# Patient Record
Sex: FEMALE | Race: WHITE | NOT HISPANIC OR LATINO | URBAN - METROPOLITAN AREA
[De-identification: names, ages, dates, MRNs, and addresses within clinical notes are randomized per-mention and may not be internally consistent; named-entity substitution may affect disease eponyms.]

---

## 2018-03-20 ENCOUNTER — EMERGENCY (EMERGENCY)
Facility: HOSPITAL | Age: 58
LOS: 0 days | Discharge: HOME | End: 2018-03-20
Admitting: GENERAL PRACTICE

## 2018-03-20 VITALS
TEMPERATURE: 97 F | RESPIRATION RATE: 18 BRPM | HEART RATE: 115 BPM | DIASTOLIC BLOOD PRESSURE: 84 MMHG | SYSTOLIC BLOOD PRESSURE: 130 MMHG | OXYGEN SATURATION: 96 %
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
## 2018-03-20 DIAGNOSIS — S80.02XA CONTUSION OF LEFT KNEE, INITIAL ENCOUNTER: ICD-10-CM
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
## 2018-03-20 DIAGNOSIS — R07.81 PLEURODYNIA: ICD-10-CM
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
## 2018-03-20 DIAGNOSIS — M79.642 PAIN IN LEFT HAND: ICD-10-CM
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
## 2018-03-20 DIAGNOSIS — Z88.8 ALLERGY STATUS TO OTHER DRUGS, MEDICAMENTS AND BIOLOGICAL SUBSTANCES: ICD-10-CM

## 2018-03-20 RX ORDER — METHOCARBAMOL 500 MG/1
1000 TABLET, FILM COATED ORAL ONCE
Qty: 0 | Refills: 0 | Status: COMPLETED | OUTPATIENT
Start: 2018-03-20 | End: 2018-03-20

## 2018-03-20 RX ORDER — ACETAMINOPHEN 500 MG
975 TABLET ORAL ONCE
Qty: 0 | Refills: 0 | Status: COMPLETED | OUTPATIENT
Start: 2018-03-20 | End: 2018-03-20

## 2018-03-20 RX ORDER — IBUPROFEN 200 MG
600 TABLET ORAL ONCE
Qty: 0 | Refills: 0 | Status: DISCONTINUED | OUTPATIENT
Start: 2018-03-20 | End: 2018-03-20

## 2018-03-20 RX ADMIN — Medication 975 MILLIGRAM(S): at 17:46

## 2018-03-20 RX ADMIN — METHOCARBAMOL 1000 MILLIGRAM(S): 500 TABLET, FILM COATED ORAL at 17:40

## 2018-03-20 NOTE — ED PROVIDER NOTE - MEDICAL DECISION MAKING DETAILS
I have discussed the discharge plan with the patient. The patient agrees with the plan, as discussed.  The patient understands Emergency Department diagnosis is a preliminary diagnosis often based on limited information and that the patient must adhere to the follow-up plan as discussed.  The patient understands that if the symptoms worsen or if prescribed medications do not have the desired/planned effect that the patient may return to the Emergency Department at any time for further evaluation and treatment. Return precautions advised.

## 2018-03-20 NOTE — ED PROVIDER NOTE - CARE PLAN
Principal Discharge DX:	Fall  Secondary Diagnosis:	Rib pain on left side  Secondary Diagnosis:	Knee contusion

## 2018-03-20 NOTE — ED PROVIDER NOTE - PHYSICAL EXAMINATION
MSK: (+) Mild TTP to proximal palmar aspect of left hand without deformity, swelling, ecchymosis or erythema. FROM of left wrist without TTP. (+) TTP to left knee with mild associated swelling and ecchymosis. No deformity. FROM. Stable to varus/valgus stress. Strength 5/5. DP 2+ B/L  BACK: No midline TTP. No paraspinal TTP.  NECK: (+) Mild TTP to left trapezius. No midline TTP. FROM.

## 2018-03-20 NOTE — ED PROVIDER NOTE - OBJECTIVE STATEMENT
59 yo F with PMHx of thyroid CA s/p resection and chronic back pain presents to the ED c/o left sided rib pain, left hand pain and left knee pain s/p mechanical fall. Pt was walking to doctors office and tripped over concrete and fell onto left knee and then her left side. She was ambulatory after fall. Pt denies fever, chills, nausea, vomiting, abdominal pain, back pain, neck pain, weakness, numbness, paresthesias, bowel/bladder dysfunction, SOB, chest pain, LOC, head trauma, use of anticoagulation medications.

## 2018-03-20 NOTE — ED PROVIDER NOTE - CARDIAC, MLM
Normal rate, regular rhythm.  Heart sounds S1, S2.  No murmurs, rubs or gallops. No chest wall TTP or crepitus. (+) Mild left lateral rib TTP without ecchymosis, erythema or crepitus.

## 2018-11-19 PROBLEM — Z00.00 ENCOUNTER FOR PREVENTIVE HEALTH EXAMINATION: Status: ACTIVE | Noted: 2018-11-19

## 2018-11-26 ENCOUNTER — APPOINTMENT (OUTPATIENT)
Dept: ORTHOPEDIC SURGERY | Facility: CLINIC | Age: 58
End: 2018-11-26

## 2019-01-25 ENCOUNTER — INPATIENT (INPATIENT)
Facility: HOSPITAL | Age: 59
LOS: 0 days | Discharge: LEFT BEFORE DISCHARGE | End: 2019-01-26
Attending: INTERNAL MEDICINE | Admitting: INTERNAL MEDICINE

## 2019-01-25 VITALS
HEART RATE: 125 BPM | RESPIRATION RATE: 20 BRPM | DIASTOLIC BLOOD PRESSURE: 77 MMHG | OXYGEN SATURATION: 98 % | HEIGHT: 60 IN | WEIGHT: 184.97 LBS | SYSTOLIC BLOOD PRESSURE: 145 MMHG | TEMPERATURE: 99 F

## 2019-01-25 DIAGNOSIS — Z85.850 PERSONAL HISTORY OF MALIGNANT NEOPLASM OF THYROID: Chronic | ICD-10-CM

## 2019-01-25 DIAGNOSIS — M48.9 SPONDYLOPATHY, UNSPECIFIED: Chronic | ICD-10-CM

## 2019-01-25 LAB
ALBUMIN SERPL ELPH-MCNC: 3.5 G/DL — SIGNIFICANT CHANGE UP (ref 3.5–5.2)
ALP SERPL-CCNC: 185 U/L — HIGH (ref 30–115)
ALT FLD-CCNC: 85 U/L — HIGH (ref 0–41)
ANION GAP SERPL CALC-SCNC: 17 MMOL/L — HIGH (ref 7–14)
AST SERPL-CCNC: 87 U/L — HIGH (ref 0–41)
BASOPHILS # BLD AUTO: 0.04 K/UL — SIGNIFICANT CHANGE UP (ref 0–0.2)
BASOPHILS NFR BLD AUTO: 0.3 % — SIGNIFICANT CHANGE UP (ref 0–1)
BILIRUB SERPL-MCNC: <0.2 MG/DL — SIGNIFICANT CHANGE UP (ref 0.2–1.2)
BUN SERPL-MCNC: 12 MG/DL — SIGNIFICANT CHANGE UP (ref 10–20)
CALCIUM SERPL-MCNC: 8.4 MG/DL — LOW (ref 8.5–10.1)
CHLORIDE SERPL-SCNC: 98 MMOL/L — SIGNIFICANT CHANGE UP (ref 98–110)
CO2 SERPL-SCNC: 25 MMOL/L — SIGNIFICANT CHANGE UP (ref 17–32)
CREAT SERPL-MCNC: 0.6 MG/DL — LOW (ref 0.7–1.5)
EOSINOPHIL # BLD AUTO: 0.04 K/UL — SIGNIFICANT CHANGE UP (ref 0–0.7)
EOSINOPHIL NFR BLD AUTO: 0.3 % — SIGNIFICANT CHANGE UP (ref 0–8)
FLU A RESULT: POSITIVE
FLU A RESULT: POSITIVE
FLUAV AG NPH QL: POSITIVE
FLUBV AG NPH QL: NEGATIVE — SIGNIFICANT CHANGE UP
GAS PNL BLDV: SIGNIFICANT CHANGE UP
GLUCOSE SERPL-MCNC: 156 MG/DL — HIGH (ref 70–99)
HCT VFR BLD CALC: 37.1 % — SIGNIFICANT CHANGE UP (ref 37–47)
HGB BLD-MCNC: 12.1 G/DL — SIGNIFICANT CHANGE UP (ref 12–16)
IMM GRANULOCYTES NFR BLD AUTO: 1 % — HIGH (ref 0.1–0.3)
LYMPHOCYTES # BLD AUTO: 1.18 K/UL — LOW (ref 1.2–3.4)
LYMPHOCYTES # BLD AUTO: 9.6 % — LOW (ref 20.5–51.1)
MAGNESIUM SERPL-MCNC: 1.9 MG/DL — SIGNIFICANT CHANGE UP (ref 1.8–2.4)
MCHC RBC-ENTMCNC: 27.9 PG — SIGNIFICANT CHANGE UP (ref 27–31)
MCHC RBC-ENTMCNC: 32.6 G/DL — SIGNIFICANT CHANGE UP (ref 32–37)
MCV RBC AUTO: 85.5 FL — SIGNIFICANT CHANGE UP (ref 81–99)
MONOCYTES # BLD AUTO: 0.67 K/UL — HIGH (ref 0.1–0.6)
MONOCYTES NFR BLD AUTO: 5.5 % — SIGNIFICANT CHANGE UP (ref 1.7–9.3)
NEUTROPHILS # BLD AUTO: 10.19 K/UL — HIGH (ref 1.4–6.5)
NEUTROPHILS NFR BLD AUTO: 83.3 % — HIGH (ref 42.2–75.2)
PLATELET # BLD AUTO: 357 K/UL — SIGNIFICANT CHANGE UP (ref 130–400)
POTASSIUM SERPL-MCNC: 4 MMOL/L — SIGNIFICANT CHANGE UP (ref 3.5–5)
POTASSIUM SERPL-SCNC: 4 MMOL/L — SIGNIFICANT CHANGE UP (ref 3.5–5)
PROT SERPL-MCNC: 6.1 G/DL — SIGNIFICANT CHANGE UP (ref 6–8)
RBC # BLD: 4.34 M/UL — SIGNIFICANT CHANGE UP (ref 4.2–5.4)
RBC # FLD: 17.4 % — HIGH (ref 11.5–14.5)
RSV RESULT: NEGATIVE — SIGNIFICANT CHANGE UP
RSV RNA RESP QL NAA+PROBE: NEGATIVE — SIGNIFICANT CHANGE UP
SODIUM SERPL-SCNC: 140 MMOL/L — SIGNIFICANT CHANGE UP (ref 135–146)
TROPONIN T SERPL-MCNC: <0.01 NG/ML — SIGNIFICANT CHANGE UP
WBC # BLD: 12.24 K/UL — HIGH (ref 4.8–10.8)
WBC # FLD AUTO: 12.24 K/UL — HIGH (ref 4.8–10.8)

## 2019-01-25 RX ORDER — SODIUM CHLORIDE 9 MG/ML
1000 INJECTION INTRAMUSCULAR; INTRAVENOUS; SUBCUTANEOUS
Qty: 0 | Refills: 0 | Status: DISCONTINUED | OUTPATIENT
Start: 2019-01-25 | End: 2019-01-26

## 2019-01-25 RX ORDER — ATORVASTATIN CALCIUM 80 MG/1
1 TABLET, FILM COATED ORAL
Qty: 0 | Refills: 0 | COMMUNITY

## 2019-01-25 RX ORDER — AMLODIPINE BESYLATE 2.5 MG/1
1 TABLET ORAL
Qty: 0 | Refills: 0 | COMMUNITY

## 2019-01-25 RX ORDER — CYCLOBENZAPRINE HYDROCHLORIDE 10 MG/1
1 TABLET, FILM COATED ORAL
Qty: 0 | Refills: 0 | COMMUNITY

## 2019-01-25 RX ORDER — LEVOTHYROXINE SODIUM 125 MCG
100 TABLET ORAL AT BEDTIME
Qty: 0 | Refills: 0 | Status: DISCONTINUED | OUTPATIENT
Start: 2019-01-25 | End: 2019-01-26

## 2019-01-25 RX ORDER — IPRATROPIUM/ALBUTEROL SULFATE 18-103MCG
3 AEROSOL WITH ADAPTER (GRAM) INHALATION EVERY 6 HOURS
Qty: 0 | Refills: 0 | Status: DISCONTINUED | OUTPATIENT
Start: 2019-01-25 | End: 2019-01-26

## 2019-01-25 RX ORDER — ALBUTEROL 90 UG/1
2.5 AEROSOL, METERED ORAL ONCE
Qty: 0 | Refills: 0 | Status: COMPLETED | OUTPATIENT
Start: 2019-01-25 | End: 2019-01-25

## 2019-01-25 RX ORDER — SODIUM CHLORIDE 9 MG/ML
1000 INJECTION INTRAMUSCULAR; INTRAVENOUS; SUBCUTANEOUS ONCE
Qty: 0 | Refills: 0 | Status: COMPLETED | OUTPATIENT
Start: 2019-01-25 | End: 2019-01-25

## 2019-01-25 RX ORDER — LOSARTAN POTASSIUM 100 MG/1
100 TABLET, FILM COATED ORAL DAILY
Qty: 0 | Refills: 0 | Status: DISCONTINUED | OUTPATIENT
Start: 2019-01-25 | End: 2019-01-26

## 2019-01-25 RX ORDER — LANOLIN ALCOHOL/MO/W.PET/CERES
5 CREAM (GRAM) TOPICAL AT BEDTIME
Qty: 0 | Refills: 0 | Status: DISCONTINUED | OUTPATIENT
Start: 2019-01-25 | End: 2019-01-26

## 2019-01-25 RX ORDER — ACETAMINOPHEN 500 MG
650 TABLET ORAL EVERY 6 HOURS
Qty: 0 | Refills: 0 | Status: DISCONTINUED | OUTPATIENT
Start: 2019-01-25 | End: 2019-01-26

## 2019-01-25 RX ORDER — INFLUENZA VIRUS VACCINE 15; 15; 15; 15 UG/.5ML; UG/.5ML; UG/.5ML; UG/.5ML
0.5 SUSPENSION INTRAMUSCULAR ONCE
Qty: 0 | Refills: 0 | Status: DISCONTINUED | OUTPATIENT
Start: 2019-01-25 | End: 2019-01-26

## 2019-01-25 RX ORDER — ATORVASTATIN CALCIUM 80 MG/1
20 TABLET, FILM COATED ORAL AT BEDTIME
Qty: 0 | Refills: 0 | Status: DISCONTINUED | OUTPATIENT
Start: 2019-01-25 | End: 2019-01-26

## 2019-01-25 RX ORDER — AMLODIPINE BESYLATE 2.5 MG/1
5 TABLET ORAL DAILY
Qty: 0 | Refills: 0 | Status: DISCONTINUED | OUTPATIENT
Start: 2019-01-25 | End: 2019-01-26

## 2019-01-25 RX ORDER — LOSARTAN POTASSIUM 100 MG/1
1 TABLET, FILM COATED ORAL
Qty: 0 | Refills: 0 | COMMUNITY

## 2019-01-25 RX ORDER — ENOXAPARIN SODIUM 100 MG/ML
40 INJECTION SUBCUTANEOUS DAILY
Qty: 0 | Refills: 0 | Status: DISCONTINUED | OUTPATIENT
Start: 2019-01-25 | End: 2019-01-26

## 2019-01-25 RX ORDER — CYCLOBENZAPRINE HYDROCHLORIDE 10 MG/1
10 TABLET, FILM COATED ORAL
Qty: 0 | Refills: 0 | Status: DISCONTINUED | OUTPATIENT
Start: 2019-01-25 | End: 2019-01-26

## 2019-01-25 RX ORDER — DIPHENHYDRAMINE HCL 50 MG
50 CAPSULE ORAL ONCE
Qty: 0 | Refills: 0 | Status: COMPLETED | OUTPATIENT
Start: 2019-01-25 | End: 2019-01-25

## 2019-01-25 RX ORDER — LEVOTHYROXINE SODIUM 125 MCG
1 TABLET ORAL
Qty: 0 | Refills: 0 | COMMUNITY

## 2019-01-25 RX ORDER — PANTOPRAZOLE SODIUM 20 MG/1
40 TABLET, DELAYED RELEASE ORAL
Qty: 0 | Refills: 0 | Status: DISCONTINUED | OUTPATIENT
Start: 2019-01-25 | End: 2019-01-26

## 2019-01-25 RX ORDER — IPRATROPIUM/ALBUTEROL SULFATE 18-103MCG
3 AEROSOL WITH ADAPTER (GRAM) INHALATION ONCE
Qty: 0 | Refills: 0 | Status: COMPLETED | OUTPATIENT
Start: 2019-01-25 | End: 2019-01-25

## 2019-01-25 RX ADMIN — Medication 3 MILLILITER(S): at 07:00

## 2019-01-25 RX ADMIN — Medication 3 MILLILITER(S): at 03:48

## 2019-01-25 RX ADMIN — Medication 100 MICROGRAM(S): at 22:49

## 2019-01-25 RX ADMIN — Medication 3 MILLILITER(S): at 13:37

## 2019-01-25 RX ADMIN — ATORVASTATIN CALCIUM 20 MILLIGRAM(S): 80 TABLET, FILM COATED ORAL at 22:49

## 2019-01-25 RX ADMIN — SODIUM CHLORIDE 100 MILLILITER(S): 9 INJECTION INTRAMUSCULAR; INTRAVENOUS; SUBCUTANEOUS at 10:08

## 2019-01-25 RX ADMIN — Medication 5 MILLIGRAM(S): at 22:49

## 2019-01-25 RX ADMIN — Medication 50 MILLIGRAM(S): at 04:43

## 2019-01-25 RX ADMIN — CYCLOBENZAPRINE HYDROCHLORIDE 10 MILLIGRAM(S): 10 TABLET, FILM COATED ORAL at 22:49

## 2019-01-25 RX ADMIN — ALBUTEROL 2.5 MILLIGRAM(S): 90 AEROSOL, METERED ORAL at 17:08

## 2019-01-25 RX ADMIN — Medication 3 MILLILITER(S): at 03:30

## 2019-01-25 RX ADMIN — Medication 60 MILLIGRAM(S): at 03:56

## 2019-01-25 RX ADMIN — Medication 75 MILLIGRAM(S): at 09:53

## 2019-01-25 RX ADMIN — Medication 60 MILLIGRAM(S): at 17:49

## 2019-01-25 RX ADMIN — SODIUM CHLORIDE 1000 MILLILITER(S): 9 INJECTION INTRAMUSCULAR; INTRAVENOUS; SUBCUTANEOUS at 03:56

## 2019-01-25 RX ADMIN — AMLODIPINE BESYLATE 5 MILLIGRAM(S): 2.5 TABLET ORAL at 18:53

## 2019-01-25 RX ADMIN — SODIUM CHLORIDE 100 MILLILITER(S): 9 INJECTION INTRAMUSCULAR; INTRAVENOUS; SUBCUTANEOUS at 18:02

## 2019-01-25 RX ADMIN — Medication 75 MILLIGRAM(S): at 17:46

## 2019-01-25 NOTE — H&P ADULT - NSHPLABSRESULTS_GEN_ALL_CORE
T(F): 99  HR: 125  BP: 145/77  RR: 20  SpO2: 98%                          12.1   12.24 )-----------( 357      ( 25 Jan 2019 02:54 )             37.1       01-25    140  |  98  |  12  ----------------------------<  156<H>  4.0   |  25  |  0.6<L>    Ca    8.4<L>      25 Jan 2019 02:54  Mg     1.9     01-25    TPro  6.1  /  Alb  3.5  /  TBili  <0.2  /  DBili  x   /  AST  87<H>  /  ALT  85<H>  /  AlkPhos  185<H>  01-25      LIVER FUNCTIONS - ( 25 Jan 2019 02:54 )  Alb: 3.5 g/dL / Pro: 6.1 g/dL / ALK PHOS: 185 U/L / ALT: 85 U/L / AST: 87 U/L / GGT: x                 CARDIAC MARKERS ( 25 Jan 2019 02:54 )  x     / <0.01 ng/mL / x     / x     / x T(F): 99  HR: 125  BP: 145/77  RR: 20  SpO2: 98%                          12.1   12.24 )-----------( 357      ( 25 Jan 2019 02:54 )             37.1       01-25    140  |  98  |  12  ----------------------------<  156<H>  4.0   |  25  |  0.6<L>    Ca    8.4<L>      25 Jan 2019 02:54  Mg     1.9     01-25    TPro  6.1  /  Alb  3.5  /  TBili  <0.2  /  DBili  x   /  AST  87<H>  /  ALT  85<H>  /  AlkPhos  185<H>  01-25      LIVER FUNCTIONS - ( 25 Jan 2019 02:54 )  Alb: 3.5 g/dL / Pro: 6.1 g/dL / ALK PHOS: 185 U/L / ALT: 85 U/L / AST: 87 U/L / GGT: x                 CARDIAC MARKERS ( 25 Jan 2019 02:54 )  x     / <0.01 ng/mL / x     / x     / x      EKG  - Sinus tachy  - NAD

## 2019-01-25 NOTE — H&P ADULT - NSHPPHYSICALEXAM_GEN_ALL_CORE
T(F): 99  HR: 125  BP: 145/77  RR: 20  SpO2: 98%      PHYSICAL EXAM:  GENERAL: NAD, well-developed  HEAD:  Atraumatic, Normocephalic  EYES: EOMI, PERRLA, conjunctiva and sclera clear  NECK: Supple, No JVD  CHEST/LUNG: Clear to auscultation bilaterally; No wheeze  HEART: Regular rate and rhythm; No murmurs, rubs, or gallops  ABDOMEN: Soft, Nontender, Nondistended; Bowel sounds present  EXTREMITIES:  2+ Peripheral Pulses, No clubbing, cyanosis, or edema  PSYCH: AAOx3  NEUROLOGY: non-focal  SKIN: No rashes or lesions T(F): 99  HR: 125  BP: 145/77  RR: 20  SpO2: 98%      PHYSICAL EXAM:  GENERAL: NAD, well-developed, obese female   NECK: Supple, No JVD  CHEST/LUNG: b/l wheezing and decreased Air Entry. tachypneic during my conversation  HEART: Regular rate and rhythm; No murmurs, rubs, or gallops  ABDOMEN: Soft, Nontender, obese and BS present  EXTREMITIES:  2+ Peripheral Pulses, No clubbing, cyanosis, or edema  PSYCH: AAOx3  NEUROLOGY: non-focal  SKIN: No rashes or lesions

## 2019-01-25 NOTE — ED PROVIDER NOTE - MEDICAL DECISION MAKING DETAILS
Pt with uri induced asthma exacerbation. No improved during ED management. Will admit for further management.

## 2019-01-25 NOTE — H&P ADULT - HISTORY OF PRESENT ILLNESS
58 y/o female with hx of HTN, DLD, presents to ED for SOB and cough since yesterday. Sx's are mild worse with deep breathing. + congestion, wheezing, chills. No fever, abd pain, chest pain, n/v/d. Pt denies hx of COPD or asthma but PCP prescribed steroids and abx during these episodes in the past that help. 58 y/o female with hx of obesity, HTN, DLD, thyroid CA s/p resection of left lobe on synthroid, cervical spine fusion  and chronic back pain  presents to ED for SOB and cough x 2 days    As per pt, she noticed SOB, cough ( non productive), wheezing and came to the ED. Her sxs were initially mild, but worsened. She is tangential and difficult to obtain hx from. She denied fevers, chills, Influenza shot this year. @ home she has a new born grandchild and concerned about passing the flu.  Per pt, she is exposed to second hand smoking and denied a hx of  COPD or asthma. She has received steroids in the past for wheezing by her PCP, but never seen by a pulmonologist. She is suppose to see one for sleep apnea.    Pt doesnt know her meds and she obtains them via [facility]. She will call her daughter and obtain the med list

## 2019-01-25 NOTE — ED PROVIDER NOTE - PHYSICAL EXAMINATION
Constitutional: Well developed, well nourished. NAD.  Head: Normocephalic, atraumatic.  Eyes: PERRL. EOMI.  ENT: No nasal discharge. Mucous membranes moist.  Neck: Supple. Painless ROM.  Cardiovascular: Normal S1, S2. Tachycardic rate. Regular rhythm. No murmurs, rubs, or gallops.  Pulmonary: Tachypnic. Poor air movement. Scattered wheezing. Speaking in full sentences.  Abdominal: Soft. Nondistended. Nontender. No rebound, guarding, rigidity.  Extremities. Pelvis stable. No lower extremity edema, symmetric calves.  Skin: No rashes, cyanosis.  Neuro: AAOx3. No focal neurological deficits.  Psych: Normal mood. Normal affect.

## 2019-01-25 NOTE — H&P ADULT - ASSESSMENT
# Influenza A- positive  - Start Tamiflu x 5 days  - Add Incentive spirometer  - Start /cc/hr x 12 hours  - Asses spo2 upon ambulation  - Start Mucinmeena Tessalon as symptomatic txt      # 60 y/o female with hx of obesity, HTN, DLD, thyroid CA s/p resection of left lobe on synthroid, cervical spine fusion  and chronic back pain  presents to ED for SOB and cough x 2 days    # Influenza A- positive  - CXR  negative   - Start Tamiflu x 5 days  - Add Incentive spirometer  - Start /cc/hr x 12 hours  - Asses spo2 upon ambulation  - Start Mucinex, as symptomatic txt  - c/w nebs and steroids- pt is still wheezing       # HTN  -Pt takes Norvasc at home and doesnt know the dose    # DLD  - on Statin @home    # Hypothyroidism  -c/w synthroid    # DVT- GI- lovenox/ protonix    Full code    Please obtain the meds from pt 58 y/o female with hx of obesity, HTN, DLD, thyroid CA s/p resection of left lobe on synthroid, cervical spine fusion  and chronic back pain  presents to ED for SOB and cough x 2 days    # Asthma exacerbation 2/2 Influenza A  - CXR  negative   - Start Tamiflu x 5 days  - Add Incentive spirometer  - c/w IV Steroid q12 for now  - Asses spo2 upon ambulation  - Start Mucinex, as symptomatic txt  - c/w nebs       # HTN  -Pt takes Norvasc at home and doesnt know the dose    # DLD  - on Statin @home    # Hypothyroidism  -c/w synthroid    # DVT- GI- lovenox/ protonix    Full code    Please obtain the meds from pt

## 2019-01-25 NOTE — ED PROVIDER NOTE - OBJECTIVE STATEMENT
Pt is a 58 y/o female with hx of HTN, DLD, presents to ED for SOB and cough since yesterday. Sx's are mild worse with deep breathing. + congestion, wheezing, chills. No fever, abd pain, chest pain, n/v/d. Pt denies hx of COPD or asthma but PCP prescribed steroids and abx during these episodes in the past that help.

## 2019-01-25 NOTE — ED PROVIDER NOTE - ATTENDING CONTRIBUTION TO CARE
I personally evaluated the patient. I reviewed the Resident’s or Physician Assistant’s note (as assigned above), and agree with the findings and plan except as documented in my note.  Pt with hx of HTN, HLD and intermittent wheezing for the past 3 days with complaints of 1 day of coughing with assocaited wheezing. No CP, fever, SOB. VS reviewed, pt well appearing. Head ncat, neck supple, no JVD, normal s1s2 without any murmurs, Lungs CTAB with increased work of breathing, tachypneic. abd +BS, s/nd/nt, extremities wnl, neuro exam grossly normal. No acute skin rashes. Plan is IV, nebs, monitor, labs, CXR , steroids and reassess.

## 2019-01-25 NOTE — ED PROVIDER NOTE - PROGRESS NOTE DETAILS
Pt's exam improved after treatment, more air movement with less wheezing. Pt still tachypnic, so will admit for asthma exacerbation. Hospitalist paged. Endorsed to MAR.

## 2019-01-26 VITALS
SYSTOLIC BLOOD PRESSURE: 143 MMHG | OXYGEN SATURATION: 91 % | TEMPERATURE: 97 F | RESPIRATION RATE: 18 BRPM | DIASTOLIC BLOOD PRESSURE: 69 MMHG | HEART RATE: 124 BPM

## 2019-01-26 LAB
ANION GAP SERPL CALC-SCNC: 18 MMOL/L — HIGH (ref 7–14)
BASOPHILS # BLD AUTO: 0 K/UL — SIGNIFICANT CHANGE UP (ref 0–0.2)
BASOPHILS NFR BLD AUTO: 0 % — SIGNIFICANT CHANGE UP (ref 0–1)
BUN SERPL-MCNC: 14 MG/DL — SIGNIFICANT CHANGE UP (ref 10–20)
CALCIUM SERPL-MCNC: 9 MG/DL — SIGNIFICANT CHANGE UP (ref 8.5–10.1)
CHLORIDE SERPL-SCNC: 100 MMOL/L — SIGNIFICANT CHANGE UP (ref 98–110)
CO2 SERPL-SCNC: 22 MMOL/L — SIGNIFICANT CHANGE UP (ref 17–32)
CREAT SERPL-MCNC: 0.5 MG/DL — LOW (ref 0.7–1.5)
EOSINOPHIL # BLD AUTO: 0 K/UL — SIGNIFICANT CHANGE UP (ref 0–0.7)
EOSINOPHIL NFR BLD AUTO: 0 % — SIGNIFICANT CHANGE UP (ref 0–8)
GLUCOSE SERPL-MCNC: 237 MG/DL — HIGH (ref 70–99)
HCT VFR BLD CALC: 35.8 % — LOW (ref 37–47)
HGB BLD-MCNC: 11.4 G/DL — LOW (ref 12–16)
IMM GRANULOCYTES NFR BLD AUTO: 1.1 % — HIGH (ref 0.1–0.3)
LYMPHOCYTES # BLD AUTO: 1.24 K/UL — SIGNIFICANT CHANGE UP (ref 1.2–3.4)
LYMPHOCYTES # BLD AUTO: 10.1 % — LOW (ref 20.5–51.1)
MCHC RBC-ENTMCNC: 27 PG — SIGNIFICANT CHANGE UP (ref 27–31)
MCHC RBC-ENTMCNC: 31.8 G/DL — LOW (ref 32–37)
MCV RBC AUTO: 84.8 FL — SIGNIFICANT CHANGE UP (ref 81–99)
MONOCYTES # BLD AUTO: 0.71 K/UL — HIGH (ref 0.1–0.6)
MONOCYTES NFR BLD AUTO: 5.8 % — SIGNIFICANT CHANGE UP (ref 1.7–9.3)
NEUTROPHILS # BLD AUTO: 10.18 K/UL — HIGH (ref 1.4–6.5)
NEUTROPHILS NFR BLD AUTO: 83 % — HIGH (ref 42.2–75.2)
PLATELET # BLD AUTO: 331 K/UL — SIGNIFICANT CHANGE UP (ref 130–400)
POTASSIUM SERPL-MCNC: 3.9 MMOL/L — SIGNIFICANT CHANGE UP (ref 3.5–5)
POTASSIUM SERPL-SCNC: 3.9 MMOL/L — SIGNIFICANT CHANGE UP (ref 3.5–5)
RBC # BLD: 4.22 M/UL — SIGNIFICANT CHANGE UP (ref 4.2–5.4)
RBC # FLD: 17.6 % — HIGH (ref 11.5–14.5)
SODIUM SERPL-SCNC: 140 MMOL/L — SIGNIFICANT CHANGE UP (ref 135–146)
WBC # BLD: 12.27 K/UL — HIGH (ref 4.8–10.8)
WBC # FLD AUTO: 12.27 K/UL — HIGH (ref 4.8–10.8)

## 2019-01-26 RX ORDER — ALPRAZOLAM 0.25 MG
0.5 TABLET ORAL ONCE
Qty: 0 | Refills: 0 | Status: DISCONTINUED | OUTPATIENT
Start: 2019-01-26 | End: 2019-01-26

## 2019-01-26 RX ADMIN — Medication 60 MILLIGRAM(S): at 17:01

## 2019-01-26 RX ADMIN — AMLODIPINE BESYLATE 5 MILLIGRAM(S): 2.5 TABLET ORAL at 06:25

## 2019-01-26 RX ADMIN — Medication 3 MILLILITER(S): at 07:53

## 2019-01-26 RX ADMIN — PANTOPRAZOLE SODIUM 40 MILLIGRAM(S): 20 TABLET, DELAYED RELEASE ORAL at 06:25

## 2019-01-26 RX ADMIN — Medication 200 MILLIGRAM(S): at 00:21

## 2019-01-26 RX ADMIN — Medication 3 MILLILITER(S): at 13:07

## 2019-01-26 RX ADMIN — Medication 75 MILLIGRAM(S): at 06:26

## 2019-01-26 RX ADMIN — Medication 60 MILLIGRAM(S): at 06:25

## 2019-01-26 RX ADMIN — CYCLOBENZAPRINE HYDROCHLORIDE 10 MILLIGRAM(S): 10 TABLET, FILM COATED ORAL at 06:45

## 2019-01-26 RX ADMIN — LOSARTAN POTASSIUM 100 MILLIGRAM(S): 100 TABLET, FILM COATED ORAL at 06:25

## 2019-01-26 RX ADMIN — Medication 0.5 MILLIGRAM(S): at 01:06

## 2019-01-26 RX ADMIN — Medication 75 MILLIGRAM(S): at 17:00

## 2019-01-26 RX ADMIN — Medication 3 MILLILITER(S): at 01:00

## 2019-01-26 NOTE — CHART NOTE - NSCHARTNOTEFT_GEN_A_CORE
Patient is on contact isolation for flu. She is leaving AMA because does not want to be in a room with another sick patient.

## 2019-01-30 LAB
CULTURE RESULTS: SIGNIFICANT CHANGE UP
CULTURE RESULTS: SIGNIFICANT CHANGE UP
SPECIMEN SOURCE: SIGNIFICANT CHANGE UP
SPECIMEN SOURCE: SIGNIFICANT CHANGE UP

## 2019-01-31 DIAGNOSIS — J45.901 UNSPECIFIED ASTHMA WITH (ACUTE) EXACERBATION: ICD-10-CM

## 2019-01-31 DIAGNOSIS — E89.0 POSTPROCEDURAL HYPOTHYROIDISM: ICD-10-CM

## 2019-01-31 DIAGNOSIS — E78.5 HYPERLIPIDEMIA, UNSPECIFIED: ICD-10-CM

## 2019-01-31 DIAGNOSIS — Z85.850 PERSONAL HISTORY OF MALIGNANT NEOPLASM OF THYROID: ICD-10-CM

## 2019-01-31 DIAGNOSIS — I10 ESSENTIAL (PRIMARY) HYPERTENSION: ICD-10-CM

## 2019-01-31 DIAGNOSIS — J10.1 INFLUENZA DUE TO OTHER IDENTIFIED INFLUENZA VIRUS WITH OTHER RESPIRATORY MANIFESTATIONS: ICD-10-CM

## 2019-04-24 ENCOUNTER — RESULT REVIEW (OUTPATIENT)
Age: 59
End: 2019-04-24

## 2019-04-24 ENCOUNTER — OUTPATIENT (OUTPATIENT)
Dept: OUTPATIENT SERVICES | Facility: HOSPITAL | Age: 59
LOS: 1 days | Discharge: HOME | End: 2019-04-24

## 2019-04-24 DIAGNOSIS — Z85.850 PERSONAL HISTORY OF MALIGNANT NEOPLASM OF THYROID: Chronic | ICD-10-CM

## 2019-04-24 DIAGNOSIS — M48.9 SPONDYLOPATHY, UNSPECIFIED: Chronic | ICD-10-CM

## 2019-04-25 DIAGNOSIS — E04.9 NONTOXIC GOITER, UNSPECIFIED: ICD-10-CM

## 2019-04-25 PROBLEM — I10 ESSENTIAL (PRIMARY) HYPERTENSION: Chronic | Status: ACTIVE | Noted: 2019-01-25

## 2019-04-25 PROBLEM — E78.5 HYPERLIPIDEMIA, UNSPECIFIED: Chronic | Status: ACTIVE | Noted: 2019-01-25

## 2019-06-19 ENCOUNTER — RECORD ABSTRACTING (OUTPATIENT)
Age: 59
End: 2019-06-19

## 2019-06-19 DIAGNOSIS — J45.909 UNSPECIFIED ASTHMA, UNCOMPLICATED: ICD-10-CM

## 2019-06-19 DIAGNOSIS — M19.90 UNSPECIFIED OSTEOARTHRITIS, UNSPECIFIED SITE: ICD-10-CM

## 2019-06-19 DIAGNOSIS — Z78.9 OTHER SPECIFIED HEALTH STATUS: ICD-10-CM

## 2019-06-19 RX ORDER — ATORVASTATIN CALCIUM 20 MG/1
20 TABLET, FILM COATED ORAL
Refills: 0 | Status: ACTIVE | COMMUNITY

## 2019-06-19 RX ORDER — CYCLOBENZAPRINE HYDROCHLORIDE 10 MG/1
10 TABLET, FILM COATED ORAL
Refills: 0 | Status: ACTIVE | COMMUNITY

## 2019-06-19 RX ORDER — AMLODIPINE BESYLATE 5 MG/1
5 TABLET ORAL
Refills: 0 | Status: ACTIVE | COMMUNITY

## 2019-06-19 RX ORDER — CHOLECALCIFEROL (VITAMIN D3) 25 MCG
TABLET ORAL
Refills: 0 | Status: ACTIVE | COMMUNITY

## 2019-08-29 ENCOUNTER — APPOINTMENT (OUTPATIENT)
Dept: ENDOCRINOLOGY | Facility: CLINIC | Age: 59
End: 2019-08-29
Payer: MEDICARE

## 2019-08-29 VITALS
HEIGHT: 60 IN | DIASTOLIC BLOOD PRESSURE: 72 MMHG | SYSTOLIC BLOOD PRESSURE: 122 MMHG | OXYGEN SATURATION: 97 % | WEIGHT: 203 LBS | HEART RATE: 78 BPM | BODY MASS INDEX: 39.85 KG/M2

## 2019-08-29 PROCEDURE — 99214 OFFICE O/P EST MOD 30 MIN: CPT

## 2019-08-29 NOTE — PHYSICAL EXAM
[Alert] : alert [No Acute Distress] : no acute distress [Well Nourished] : well nourished [Well Developed] : well developed [Normal Sclera/Conjunctiva] : normal sclera/conjunctiva [EOMI] : extra ocular movement intact [No Proptosis] : no proptosis [Normal Oropharynx] : the oropharynx was normal [Thyroid Not Enlarged] : the thyroid was not enlarged [No Thyroid Nodules] : there were no palpable thyroid nodules [No Respiratory Distress] : no respiratory distress [Clear to Auscultation] : lungs were clear to auscultation bilaterally [No Accessory Muscle Use] : no accessory muscle use [Normal Rate] : heart rate was normal  [Normal S1, S2] : normal S1 and S2 [Regular Rhythm] : with a regular rhythm [Pedal Pulses Normal] : the pedal pulses are present [No Edema] : there was no peripheral edema [Normal Bowel Sounds] : normal bowel sounds [Not Tender] : non-tender [Soft] : abdomen soft [Not Distended] : not distended [Post Cervical Nodes] : posterior cervical nodes [Axillary Nodes] : axillary nodes [Anterior Cervical Nodes] : anterior cervical nodes [Normal] : normal and non tender [No Spinal Tenderness] : no spinal tenderness [No Stigmata of Cushings Syndrome] : no stigmata of cushings syndrome [Spine Straight] : spine straight [Normal Gait] : normal gait [Normal Strength/Tone] : muscle strength and tone were normal [No Rash] : no rash [Normal Reflexes] : deep tendon reflexes were 2+ and symmetric [No Tremors] : no tremors [Oriented x3] : oriented to person, place, and time [Acanthosis Nigricans] : no acanthosis nigricans

## 2019-08-29 NOTE — ASSESSMENT
[FreeTextEntry1] : Pt. has stable low reading for thyroglobulin level. TSH is mildly suppressed with normal T4 and T3 and pt. is clinically euthyroid. Risks of subclinical hyperthyroidism (BMD, Cardiac etc.) have been discussed in detail and pt. is aware. Thyroid U/S to be done in April... Will continue with current dose and follow thyroglobulin level and TFTs.\par Reviewed again FNA results from $/2019\par Pt with elevated FS in prediabetic range. Long and full review of diet, exercise and risks associated with pre diabetes\par ACTH and cortisol were low. .

## 2019-09-12 ENCOUNTER — APPOINTMENT (OUTPATIENT)
Dept: SURGERY | Facility: CLINIC | Age: 59
End: 2019-09-12
Payer: MEDICARE

## 2019-09-12 VITALS
BODY MASS INDEX: 39.23 KG/M2 | DIASTOLIC BLOOD PRESSURE: 74 MMHG | SYSTOLIC BLOOD PRESSURE: 108 MMHG | WEIGHT: 199.8 LBS | HEIGHT: 60 IN

## 2019-09-12 DIAGNOSIS — K64.5 PERIANAL VENOUS THROMBOSIS: ICD-10-CM

## 2019-09-12 PROCEDURE — 46600 DIAGNOSTIC ANOSCOPY SPX: CPT

## 2019-09-12 PROCEDURE — 99203 OFFICE O/P NEW LOW 30 MIN: CPT | Mod: 25

## 2019-09-13 NOTE — PHYSICAL EXAM
[Excoriation] : excoriations [Nonprolapsing] : a nonprolapsing (grade I) [Thrombosed] : that was thrombosed [Normal] : was normal [None] : there was no rectal mass  [Alert] : alert [Oriented to Person] : oriented to person [Oriented to Place] : oriented to place [Calm] : calm [Oriented to Time] : oriented to time [de-identified] : Healthy female, NAD [de-identified] : Full range of motion [de-identified] : No focal deficits.

## 2019-09-13 NOTE — ASSESSMENT
[FreeTextEntry1] : Ms. Doss has a resolving thrombosed external hemorrhoid. Motor are some hydrocortisone cream to use 3 times a day for the next couple weeks to see if we could accelerate resorption of the hematoma. She'll return to see me in 3-4 weeks for reevaluation. Certainly if she has any problems or questions prior to that she'll see me sooner.

## 2019-09-17 ENCOUNTER — APPOINTMENT (OUTPATIENT)
Dept: CARDIOLOGY | Facility: CLINIC | Age: 59
End: 2019-09-17

## 2019-10-08 ENCOUNTER — APPOINTMENT (OUTPATIENT)
Dept: SURGERY | Facility: CLINIC | Age: 59
End: 2019-10-08

## 2019-10-09 ENCOUNTER — APPOINTMENT (OUTPATIENT)
Dept: CARDIOLOGY | Facility: CLINIC | Age: 59
End: 2019-10-09
Payer: MEDICARE

## 2019-10-09 PROCEDURE — 93306 TTE W/DOPPLER COMPLETE: CPT

## 2020-01-01 NOTE — PROCEDURE
[FreeTextEntry1] : Anoscopy performed using a disposable anoscope.  The patient was place in the left lateral decubitus position. After JUSTICE was performed the anoscope was inserted and the distal rectum was evaluated along with the anal canal and anal margin.\par \par Findings:\par Examination of the perineum reveals an obvious thrombosed right lateral external hemorrhoid, is soft to touch and no active bleeding.\par \par JUSTICE, good tone no palpable mass.\par \par Anoscopy, minimal internal hemorrhoids. 19.29

## 2020-02-08 ENCOUNTER — APPOINTMENT (OUTPATIENT)
Dept: ENDOCRINOLOGY | Facility: CLINIC | Age: 60
End: 2020-02-08

## 2020-04-11 ENCOUNTER — APPOINTMENT (OUTPATIENT)
Dept: ENDOCRINOLOGY | Facility: CLINIC | Age: 60
End: 2020-04-11
Payer: MEDICARE

## 2020-04-11 PROCEDURE — G2012 BRIEF CHECK IN BY MD/QHP: CPT

## 2020-11-04 ENCOUNTER — APPOINTMENT (OUTPATIENT)
Dept: ENDOCRINOLOGY | Facility: CLINIC | Age: 60
End: 2020-11-04
Payer: MEDICARE

## 2020-11-04 VITALS
WEIGHT: 137 LBS | HEIGHT: 60 IN | BODY MASS INDEX: 26.9 KG/M2 | HEART RATE: 79 BPM | OXYGEN SATURATION: 98 % | TEMPERATURE: 97.2 F | SYSTOLIC BLOOD PRESSURE: 110 MMHG | DIASTOLIC BLOOD PRESSURE: 74 MMHG

## 2020-11-04 DIAGNOSIS — Z98.84 BARIATRIC SURGERY STATUS: ICD-10-CM

## 2020-11-04 PROCEDURE — 99214 OFFICE O/P EST MOD 30 MIN: CPT

## 2020-11-04 PROCEDURE — 99072 ADDL SUPL MATRL&STAF TM PHE: CPT

## 2020-11-04 NOTE — ASSESSMENT
[FreeTextEntry1] : The pt. has history of thyroid carcinoma with partial thyroidectomy. Pt. has been followed with thyroglobulin with most recent level  22 and imaging with U/S (10/20/2020). The R nodule is 12 mm which has decreased. There is also a 6 mm nodule. Will recheck in 1 year as FNA genetic results had very low probability for malignancy. \par Goal has been to suppress TSH (0.01) with normal T4 and T3 and clinical euthyroid state. Risks of subclinical hyperthyroid reviewed in great detail including BMD and cardiac issues.Risks of subclinical hyperthyroidism (BMD,cardio etc.) and hypothyroidism ( fatigue , muscle aches, weight gain etc.)  discussed in detail and given clinical euthyroid state after discussion pt. is comfortable with current dose.\par Lipid levels were reviewed with patient and importance and function of LDL, HDL and triglycerides discussed. Methods to increase HDL (exercise, fish, beans, oat meal, legumes etc.) discussed with pt. in conjunction with measures to decrease LDL and triglycerides  including diet and exercise.LDL/HDL was 104/53-->  123/60 . Current regimen of treatment to continue. Risks of statins were discussed in detail. \par Pt. has history of hypertension. Risks related to hypertension including cardiac, renal and vascular fully discussed. Diet discussed as well. Medications and importance of compliance reviewed.\par Pt. has Impaired fasting glucose and  current HbA1c is 6.0 We have discussed diet/exercise and risks related to diabetes in great detail.\par \par

## 2020-12-09 ENCOUNTER — RESULT REVIEW (OUTPATIENT)
Age: 60
End: 2020-12-09

## 2021-05-08 ENCOUNTER — APPOINTMENT (OUTPATIENT)
Dept: ENDOCRINOLOGY | Facility: CLINIC | Age: 61
End: 2021-05-08
Payer: MEDICARE

## 2021-05-08 VITALS
BODY MASS INDEX: 25.39 KG/M2 | SYSTOLIC BLOOD PRESSURE: 132 MMHG | HEART RATE: 79 BPM | DIASTOLIC BLOOD PRESSURE: 88 MMHG | HEIGHT: 60 IN | OXYGEN SATURATION: 96 % | TEMPERATURE: 96 F | WEIGHT: 129.31 LBS

## 2021-05-08 PROCEDURE — 99214 OFFICE O/P EST MOD 30 MIN: CPT

## 2021-05-08 PROCEDURE — 99072 ADDL SUPL MATRL&STAF TM PHE: CPT

## 2021-05-08 NOTE — ASSESSMENT
[FreeTextEntry1] : The pt. has history of thyroid carcinoma with partial thyroidectomy. Pt. has been followed with thyroglobulin with most recent level up from  22 => 71.4 and imaging with U/S (10/20/2020). The R nodule was 12 mm which had decreased. There is also a 6 mm nodule. Will recheck in 6 months as FNA genetic results had very low probability for malignancy but given increase in thyroglobulin wish to be cautious. . \par Goal has been to suppress TSH (0.01) with normal T4 and T3 and clinical euthyroid state.  TSH (<0.01 )  is suppressed but free T3 (4.8) is elevated but possibly from taking medication prior to labs.  Risks of subclinical hyperthyroidism (BMD,cardio etc.) and hypothyroidism ( fatigue , muscle aches, weight gain etc.)  discussed in detail and given clinical euthyroid state after discussion pt. is comfortable with current dose but given elevated T3 to go with 1/*2 dose. \par Lipid levels were reviewed with patient and importance and function of LDL, HDL and triglycerides discussed. Methods to increase HDL (exercise, fish, beans, oat meal, legumes etc.) discussed with pt. in conjunction with measures to decrease LDL and triglycerides  including diet and exercise.LDL/HDL was 104/53-->  123/60-> 81/56 . Current regimen of treatment (Atorvastatin 20 mg) to continue. Risks of statins were discussed in detail. \par Pt. has history of hypertension. Risks related to hypertension including cardiac, renal and vascular fully discussed. Diet discussed as well. Medications and importance of compliance reviewed.\par Pt. has Impaired fasting glucose and but no current HbA1c (previous was  is 6.0)  We have discussed diet/exercise and risks related to diabetes in great detail.\par Pt had COVID in Feb. \par  Pt gets epidural injections with pain management. Pt may need repeat C spine surgery.

## 2021-05-08 NOTE — PHYSICAL EXAM
[Alert] : alert [Well Nourished] : well nourished [No Acute Distress] : no acute distress [Well Developed] : well developed [Normal Sclera/Conjunctiva] : normal sclera/conjunctiva [EOMI] : extra ocular movement intact [No Proptosis] : no proptosis [Normal Oropharynx] : the oropharynx was normal [No Respiratory Distress] : no respiratory distress [No Accessory Muscle Use] : no accessory muscle use [Clear to Auscultation] : lungs were clear to auscultation bilaterally [Normal S1, S2] : normal S1 and S2 [Normal Rate] : heart rate was normal [Regular Rhythm] : with a regular rhythm [No Edema] : no peripheral edema [Pedal Pulses Normal] : the pedal pulses are present [Normal Bowel Sounds] : normal bowel sounds [Not Tender] : non-tender [Not Distended] : not distended [Soft] : abdomen soft [Normal Anterior Cervical Nodes] : no anterior cervical lymphadenopathy [No Spinal Tenderness] : no spinal tenderness [Spine Straight] : spine straight [No Stigmata of Cushings Syndrome] : no stigmata of Cushings Syndrome [Normal Gait] : normal gait [Normal Strength/Tone] : muscle strength and tone were normal [No Rash] : no rash [Normal Reflexes] : deep tendon reflexes were 2+ and symmetric [No Tremors] : no tremors [Oriented x3] : oriented to person, place, and time [Acanthosis Nigricans] : no acanthosis nigricans

## 2021-05-26 ENCOUNTER — OUTPATIENT (OUTPATIENT)
Dept: OUTPATIENT SERVICES | Facility: HOSPITAL | Age: 61
LOS: 1 days | End: 2021-05-26
Payer: MEDICARE

## 2021-05-26 ENCOUNTER — APPOINTMENT (OUTPATIENT)
Dept: ORTHOPEDIC SURGERY | Facility: CLINIC | Age: 61
End: 2021-05-26
Payer: MEDICARE

## 2021-05-26 ENCOUNTER — RESULT REVIEW (OUTPATIENT)
Age: 61
End: 2021-05-26

## 2021-05-26 VITALS
SYSTOLIC BLOOD PRESSURE: 122 MMHG | OXYGEN SATURATION: 98 % | HEART RATE: 78 BPM | DIASTOLIC BLOOD PRESSURE: 80 MMHG | BODY MASS INDEX: 25.32 KG/M2 | TEMPERATURE: 98.1 F | WEIGHT: 129 LBS | HEIGHT: 60 IN

## 2021-05-26 DIAGNOSIS — Z85.828 PERSONAL HISTORY OF OTHER MALIGNANT NEOPLASM OF SKIN: ICD-10-CM

## 2021-05-26 DIAGNOSIS — Z80.6 FAMILY HISTORY OF LEUKEMIA: ICD-10-CM

## 2021-05-26 DIAGNOSIS — Z82.61 FAMILY HISTORY OF ARTHRITIS: ICD-10-CM

## 2021-05-26 DIAGNOSIS — M48.9 SPONDYLOPATHY, UNSPECIFIED: Chronic | ICD-10-CM

## 2021-05-26 DIAGNOSIS — Z60.2 PROBLEMS RELATED TO LIVING ALONE: ICD-10-CM

## 2021-05-26 DIAGNOSIS — Z85.850 PERSONAL HISTORY OF MALIGNANT NEOPLASM OF THYROID: ICD-10-CM

## 2021-05-26 DIAGNOSIS — Z83.2 FAMILY HISTORY OF DISEASES OF THE BLOOD AND BLOOD-FORMING ORGANS AND CERTAIN DISORDERS INVOLVING THE IMMUNE MECHANISM: ICD-10-CM

## 2021-05-26 DIAGNOSIS — Z56.0 UNEMPLOYMENT, UNSPECIFIED: ICD-10-CM

## 2021-05-26 DIAGNOSIS — Z78.9 OTHER SPECIFIED HEALTH STATUS: ICD-10-CM

## 2021-05-26 DIAGNOSIS — Z85.850 PERSONAL HISTORY OF MALIGNANT NEOPLASM OF THYROID: Chronic | ICD-10-CM

## 2021-05-26 DIAGNOSIS — Z86.39 PERSONAL HISTORY OF OTHER ENDOCRINE, NUTRITIONAL AND METABOLIC DISEASE: ICD-10-CM

## 2021-05-26 DIAGNOSIS — Z86.79 PERSONAL HISTORY OF OTHER DISEASES OF THE CIRCULATORY SYSTEM: ICD-10-CM

## 2021-05-26 PROCEDURE — 72050 X-RAY EXAM NECK SPINE 4/5VWS: CPT

## 2021-05-26 PROCEDURE — 72082 X-RAY EXAM ENTIRE SPI 2/3 VW: CPT

## 2021-05-26 PROCEDURE — 99072 ADDL SUPL MATRL&STAF TM PHE: CPT

## 2021-05-26 PROCEDURE — 72050 X-RAY EXAM NECK SPINE 4/5VWS: CPT | Mod: 26,59

## 2021-05-26 PROCEDURE — 99204 OFFICE O/P NEW MOD 45 MIN: CPT

## 2021-05-26 PROCEDURE — 72082 X-RAY EXAM ENTIRE SPI 2/3 VW: CPT | Mod: 26

## 2021-05-26 RX ORDER — HYDROCORTISONE 25 MG/G
2.5 CREAM TOPICAL 3 TIMES DAILY
Qty: 2 | Refills: 1 | Status: DISCONTINUED | COMMUNITY
Start: 2019-09-12 | End: 2021-05-26

## 2021-05-26 RX ORDER — PREDNISONE 20 MG/1
20 TABLET ORAL
Refills: 0 | Status: DISCONTINUED | COMMUNITY
End: 2021-05-26

## 2021-05-26 SDOH — SOCIAL STABILITY - SOCIAL INSECURITY: PROBLEMS RELATED TO LIVING ALONE: Z60.2

## 2021-05-26 SDOH — ECONOMIC STABILITY - INCOME SECURITY: UNEMPLOYMENT, UNSPECIFIED: Z56.0

## 2021-05-28 PROBLEM — Z82.61 FAMILY HISTORY OF ARTHRITIS: Status: ACTIVE | Noted: 2021-05-26

## 2021-05-28 PROBLEM — Z83.2 FAMILY HISTORY OF AUTOIMMUNE DISORDER: Status: ACTIVE | Noted: 2021-05-26

## 2021-05-28 PROBLEM — Z78.9 DENIES ALCOHOL CONSUMPTION: Status: ACTIVE | Noted: 2021-05-26

## 2021-05-28 PROBLEM — Z86.79 HISTORY OF HYPERTENSION: Status: RESOLVED | Noted: 2021-05-26 | Resolved: 2021-05-28

## 2021-05-28 PROBLEM — Z60.2 PERSON LIVING ALONE: Status: ACTIVE | Noted: 2021-05-26

## 2021-05-28 PROBLEM — Z56.0 NOT CURRENTLY WORKING DUE TO DISABLED STATUS: Status: ACTIVE | Noted: 2021-05-26

## 2021-05-28 PROBLEM — Z78.9 DOES NOT USE ILLICIT DRUGS: Status: ACTIVE | Noted: 2021-05-26

## 2021-05-28 PROBLEM — Z86.39 HISTORY OF HIGH CHOLESTEROL: Status: RESOLVED | Noted: 2021-05-26 | Resolved: 2021-05-28

## 2021-05-28 PROBLEM — Z85.850 HISTORY OF MALIGNANT NEOPLASM OF THYROID: Status: RESOLVED | Noted: 2021-05-26 | Resolved: 2021-05-28

## 2021-05-28 PROBLEM — Z80.6 FAMILY HISTORY OF LEUKEMIA: Status: ACTIVE | Noted: 2021-05-26

## 2021-05-28 PROBLEM — Z85.828 HISTORY OF MALIGNANT NEOPLASM OF SKIN: Status: RESOLVED | Noted: 2021-05-26 | Resolved: 2021-05-28

## 2021-05-28 RX ORDER — LISINOPRIL 30 MG/1
TABLET ORAL
Refills: 0 | Status: ACTIVE | COMMUNITY

## 2021-06-08 NOTE — PHYSICAL EXAM
[de-identified] : Physical Exam:\par \par General: patient is well developed, well nourished, in no acute \par distress, alert and oriented x 3. \par \par Mood and affect: normal\par \par Respiratory: no respiratory distress noted\par \par Skin: no scars over spine, skin intact, no erythema, increased warmth\par \par Alignment:The spine is well compensated in the coronal and sagittal plane. \par \par Gait: The patient is able to toe walk and heel walk without difficulty. \par \par Palpation: no tenderness to palpation cervical spine or paraspinal region\par \par Range of motion: Cervical spine ROM is restricted\par \par Neurologic Exam:\par Motor: Manual Muscle testing in the upper and lower extremities is 5 out of 5 in all muscle groups. There is no evidence of muscular atrophy in the upper extremities. Sensory: Sensation to light touch is grossly intact in the upper and lower extremities\par \par Reflexes: DTR are present and symmetric throughout, negative meléndez bilaterally, negative inverted radial reflex bilaterally, no clonus, plantar responses are flexor\par \par Special tests: Spurlings sign absent. Lhermitte's sign is absent. .\par \par Vascular: Examination of the peripheral vascular system demonstrates no evidence of congestion or edema. no lymphedema bilateral lower extremities, pulses are present and symmetric in both lower extremities.\par \par  [de-identified] : MRI cervical 5/26/21: s/p C5/C6 and C6/C7 ACDF, well decompressed at those levels; C4/C5 moderate disc osteophyte with moderate central canal and right foraminal stenosis; C7/T1 2-3mm anterolisthesis, disc osteophyte, moderate left foraminal stenosis; no cord compression or cord signal change\par \par XR 5/26/21: s/p C5/C6 and C6/C7 ACDF, hardware intact, appears fused; trace anterolisthesis C7 on T1, no dynamic instability, multilevel degenerative changes, no fractures

## 2021-06-08 NOTE — HISTORY OF PRESENT ILLNESS
[de-identified] : Ms. MORRISON is a very pleasant 61 year old female who complains of neck pain for years, atraumatic. Underwent a C5/C6 ACDF in 2006 with 1-2 years of moderate relief of pain. Underwent a C6/C7 ACDF in 2012 with Dr. Walker with an additional 1-2 years of moderate pain relief. Current symptoms localized to neck, no radiation to upper extremities. Denies upper extremity numbness/weakness/paresthesias. Denies gait instability, denies problems with hand dexterity. Has been under the care of pain management, gets injections intermittently with relief.\par \par The patient has no history of unexpected weight loss, no history of active cancer, no history bladder or bowel dysfunction, no night pain, no fevers or chills.\par \par The past medical history, surgical history, family history, allergies, medications, 10+ point review of systems, family history and social history were reviewed and non contributory.\par \par

## 2021-06-08 NOTE — DISCUSSION/SUMMARY
[de-identified] : Discussed the results of the patient's history, physical exam, and imaging.  There is no indication for surgical intervention at this time. I am recommending she continue follow up with pain management. Can also consider physical therapy if she wishes. The patient will follow up with me as needed.  All questions were answered.\par \par '

## 2021-06-19 ENCOUNTER — APPOINTMENT (OUTPATIENT)
Dept: ENDOCRINOLOGY | Facility: CLINIC | Age: 61
End: 2021-06-19

## 2021-07-08 ENCOUNTER — APPOINTMENT (OUTPATIENT)
Dept: ENDOCRINOLOGY | Facility: CLINIC | Age: 61
End: 2021-07-08
Payer: MEDICARE

## 2021-07-08 PROCEDURE — G2012 BRIEF CHECK IN BY MD/QHP: CPT

## 2021-08-11 ENCOUNTER — APPOINTMENT (OUTPATIENT)
Dept: ORTHOPEDIC SURGERY | Facility: CLINIC | Age: 61
End: 2021-08-11

## 2021-10-03 PROBLEM — R73.01 IMPAIRED FASTING GLUCOSE: Status: ACTIVE | Noted: 2020-11-04

## 2021-10-03 PROBLEM — I10 HIGH BLOOD PRESSURE: Status: ACTIVE | Noted: 2019-06-19

## 2021-10-03 PROBLEM — E78.00 HIGH CHOLESTEROL: Status: ACTIVE | Noted: 2019-06-19

## 2021-10-04 ENCOUNTER — APPOINTMENT (OUTPATIENT)
Dept: ENDOCRINOLOGY | Facility: CLINIC | Age: 61
End: 2021-10-04
Payer: MEDICARE

## 2021-10-04 DIAGNOSIS — E78.00 PURE HYPERCHOLESTEROLEMIA, UNSPECIFIED: ICD-10-CM

## 2021-10-04 DIAGNOSIS — R73.01 IMPAIRED FASTING GLUCOSE: ICD-10-CM

## 2021-10-04 DIAGNOSIS — I10 ESSENTIAL (PRIMARY) HYPERTENSION: ICD-10-CM

## 2021-10-04 PROCEDURE — 99213 OFFICE O/P EST LOW 20 MIN: CPT

## 2021-10-27 ENCOUNTER — APPOINTMENT (OUTPATIENT)
Dept: ORTHOPEDIC SURGERY | Facility: CLINIC | Age: 61
End: 2021-10-27
Payer: MEDICARE

## 2021-10-27 DIAGNOSIS — G89.29 CERVICALGIA: ICD-10-CM

## 2021-10-27 DIAGNOSIS — Z98.1 ARTHRODESIS STATUS: ICD-10-CM

## 2021-10-27 DIAGNOSIS — M54.2 CERVICALGIA: ICD-10-CM

## 2021-10-27 PROCEDURE — 99214 OFFICE O/P EST MOD 30 MIN: CPT

## 2021-10-28 PROBLEM — M54.2 CHRONIC NECK PAIN: Status: ACTIVE | Noted: 2021-05-28

## 2021-10-28 PROBLEM — Z98.1 S/P CERVICAL SPINAL FUSION: Status: ACTIVE | Noted: 2021-05-28

## 2021-10-28 NOTE — PHYSICAL EXAM
[de-identified] : Physical Exam:\par \par General: patient is well developed, well nourished, in no acute \par distress, alert and oriented x 3. \par \par Mood and affect: normal\par \par Respiratory: no respiratory distress noted\par \par Skin: no scars over spine, skin intact, no erythema, increased warmth\par \par Alignment:The spine is well compensated in the coronal and sagittal plane. \par \par Gait: The patient is able to toe walk and heel walk without difficulty. \par \par Palpation: no tenderness to palpation cervical spine or paraspinal region\par \par Range of motion: Cervical spine ROM is restricted\par \par Neurologic Exam:\par Motor: Manual Muscle testing in the upper and lower extremities is 5 out of 5 in all muscle groups. There is no evidence of muscular atrophy in the upper extremities. Sensory: Sensation to light touch is grossly intact in the upper and lower extremities\par \par Reflexes: DTR are present and symmetric throughout\par \par Special tests: Spurlings sign absent. Lhermitte's sign is absent. .\par  [de-identified] : MRI cervical 5/26/21: s/p C5/C6 and C6/C7 ACDF, well decompressed at those levels; C4/C5 moderate disc osteophyte with moderate central canal and right foraminal stenosis; C7/T1 2-3mm anterolisthesis, disc osteophyte, moderate left foraminal stenosis; no cord compression or cord signal change\par \par XR 5/26/21: s/p C5/C6 and C6/C7 ACDF, hardware intact, appears fused; trace anterolisthesis C7 on T1, no dynamic instability, multilevel degenerative changes, no fractures.

## 2021-10-28 NOTE — HISTORY OF PRESENT ILLNESS
[de-identified] : Follow up 10/27/21: Patient presents for follow up. Continues to have neck pain, no radiation to upper extremities. Had injection last week with pain management specialist without relief. Denies new neurologic symptoms.\par \par Initial 5/26/21: Ms. MORRISON is a very pleasant 61 year old female who complains of neck pain for years, atraumatic. Underwent a C5/C6 ACDF in 2006 with 1-2 years of moderate relief of pain. Underwent a C6/C7 ACDF in 2012 with Dr. Walker with an additional 1-2 years of moderate pain relief. Current symptoms localized to neck, no radiation to upper extremities. Denies upper extremity numbness/weakness/paresthesias. Denies gait instability, denies problems with hand dexterity. Has been under the care of pain management, gets injections intermittently with relief.\par \par The patient has no history of unexpected weight loss, no history of active cancer, no history bladder or bowel dysfunction, no night pain, no fevers or chills.\par \par The past medical history, surgical history, family history, allergies, medications, 10+ point review of systems, family history and social history were reviewed and non contributory.\par \par

## 2021-10-28 NOTE — DISCUSSION/SUMMARY
[de-identified] : Discussed my findings with the patient. There is no indication for surgical intervention at this time. I am recommending an evaluation with Dr. Vega for consideration of botox injections. If no relief, recommending evaluation with Dr. Reece for spinal cord stimulator trial. The patient will follow up with me as needed. All questions answered.

## 2021-11-10 ENCOUNTER — APPOINTMENT (OUTPATIENT)
Dept: PHYSICAL MEDICINE AND REHAB | Facility: CLINIC | Age: 61
End: 2021-11-10
Payer: MEDICARE

## 2021-11-10 VITALS — WEIGHT: 129 LBS | HEIGHT: 60 IN | BODY MASS INDEX: 25.32 KG/M2 | RESPIRATION RATE: 16 BRPM

## 2021-11-10 DIAGNOSIS — G24.3 SPASMODIC TORTICOLLIS: ICD-10-CM

## 2021-11-10 PROCEDURE — 99204 OFFICE O/P NEW MOD 45 MIN: CPT

## 2021-11-10 RX ORDER — ONABOTULINUMTOXINA 100 [USP'U]/1
100 INJECTION, POWDER, LYOPHILIZED, FOR SOLUTION INTRADERMAL; INTRAMUSCULAR
Qty: 2 | Refills: 0 | Status: ACTIVE | COMMUNITY
Start: 2021-11-10

## 2021-11-15 ENCOUNTER — APPOINTMENT (OUTPATIENT)
Dept: ENDOCRINOLOGY | Facility: CLINIC | Age: 61
End: 2021-11-15

## 2021-12-12 ENCOUNTER — EMERGENCY (EMERGENCY)
Facility: HOSPITAL | Age: 61
LOS: 0 days | Discharge: HOME | End: 2021-12-12
Attending: EMERGENCY MEDICINE | Admitting: EMERGENCY MEDICINE
Payer: MEDICARE

## 2021-12-12 VITALS
WEIGHT: 136.91 LBS | OXYGEN SATURATION: 100 % | TEMPERATURE: 98 F | SYSTOLIC BLOOD PRESSURE: 160 MMHG | HEIGHT: 60 IN | RESPIRATION RATE: 18 BRPM | HEART RATE: 85 BPM | DIASTOLIC BLOOD PRESSURE: 77 MMHG

## 2021-12-12 DIAGNOSIS — E78.5 HYPERLIPIDEMIA, UNSPECIFIED: ICD-10-CM

## 2021-12-12 DIAGNOSIS — R07.81 PLEURODYNIA: ICD-10-CM

## 2021-12-12 DIAGNOSIS — E03.9 HYPOTHYROIDISM, UNSPECIFIED: ICD-10-CM

## 2021-12-12 DIAGNOSIS — C73 MALIGNANT NEOPLASM OF THYROID GLAND: ICD-10-CM
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
## 2021-12-12 DIAGNOSIS — Z87.891 PERSONAL HISTORY OF NICOTINE DEPENDENCE: ICD-10-CM

## 2021-12-12 DIAGNOSIS — I10 ESSENTIAL (PRIMARY) HYPERTENSION: ICD-10-CM
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
## 2021-12-12 DIAGNOSIS — Z85.850 PERSONAL HISTORY OF MALIGNANT NEOPLASM OF THYROID: Chronic | ICD-10-CM

## 2021-12-12 DIAGNOSIS — M48.9 SPONDYLOPATHY, UNSPECIFIED: Chronic | ICD-10-CM

## 2021-12-12 PROCEDURE — 72125 CT NECK SPINE W/O DYE: CPT | Mod: 26,MA

## 2021-12-12 PROCEDURE — 71101 X-RAY EXAM UNILAT RIBS/CHEST: CPT | Mod: 26,LT

## 2021-12-12 PROCEDURE — 70450 CT HEAD/BRAIN W/O DYE: CPT | Mod: 26,MA

## 2021-12-12 PROCEDURE — 99285 EMERGENCY DEPT VISIT HI MDM: CPT

## 2021-12-12 RX ORDER — ACETAMINOPHEN 500 MG
975 TABLET ORAL ONCE
Refills: 0 | Status: COMPLETED | OUTPATIENT
Start: 2021-12-12 | End: 2021-12-12

## 2021-12-12 RX ADMIN — Medication 975 MILLIGRAM(S): at 17:53

## 2021-12-12 NOTE — ED ADULT NURSE NOTE - OBJECTIVE STATEMENT
Patient is a 61 year old female complaining of fall on [employer] bus, patient fell down 2 steps and is complaining of back and head pain. No LOC, no anticoagulation.

## 2021-12-12 NOTE — ED ADULT NURSE NOTE - CAS EDP DISCH TYPE
I attempted to call the patient regarding the message below. I left a voicemail to return the call to the office. Home

## 2021-12-12 NOTE — ED ADULT NURSE NOTE - CHIEF COMPLAINT QUOTE
Patient BIBA from [facility], she fell when the  hit the breaks landing on her back. pt denies loc or head injury, denies blood thinners. pt complains of back pain.

## 2021-12-12 NOTE — ED PROVIDER NOTE - OBJECTIVE STATEMENT
62 yo female with a pmh of HTN, HLD, hypothyroidism, and thyroid CA present after a fall while was on the bus. pt states to have fell back hitting her L side and head. pt denies any LOC. pt states to have experienced blurred vision briefly after the fall and has pain to her L ribs. pt denies any other symptoms including fevers, chill, headache, recent illness/travel, cough, abdominal pain, chest pain, or SOB.

## 2021-12-12 NOTE — ED PROVIDER NOTE - NSFOLLOWUPCLINICS_GEN_ALL_ED_FT
[facility] at [facility]  [address], [address]  [place] [address]  Phone: [phone]  Fax:   Follow Up Time: 1-3 Days    [facility] ([facility])  Rehabilitation  [facility] [address], [address] [place] [address]  Phone: [phone]  Fax:   Follow Up Time: 1-3 Days

## 2021-12-12 NOTE — ED PROVIDER NOTE - NSFOLLOWUPINSTRUCTIONS_ED_ALL_ED_FT
Please follow up with your primary care physician within 24-72 hours and return immediately if symptoms worsen.    Fall Prevention in the Home, Adult  Falls can cause injuries and can affect people from all age groups. There are many simple things that you can do to make your home safe and to help prevent falls. Ask for help when making these changes, if needed.    What actions can I take to prevent falls?  General instructions     Use good lighting in all rooms. Replace any light bulbs that burn out.  Turn on lights if it is dark. Use night-lights.  Place frequently used items in easy-to-reach places. Lower the shelves around your home if necessary.  Set up furniture so that there are clear paths around it. Avoid moving your furniture around.  Remove throw rugs and other tripping hazards from the floor.  Avoid walking on wet floors.  Fix any uneven floor surfaces.  Add color or contrast paint or tape to grab bars and handrails in your home. Place contrasting color strips on the first and last steps of stairways.  When you use a stepladder, make sure that it is completely opened and that the sides are firmly locked. Have someone hold the ladder while you are using it. Do not climb a closed stepladder.  Be aware of any and all pets.  What can I do in the bathroom?     Keep the floor dry. Immediately clean up any water that spills onto the floor.  Remove soap buildup in the tub or shower on a regular basis.  Use non-skid mats or decals on the floor of the tub or shower.  Attach bath mats securely with double-sided, non-slip rug tape.  If you need to sit down while you are in the shower, use a plastic, non-slip stool.  Image ImageInstall grab bars by the toilet and in the tub and shower. Do not use towel bars as grab bars.  What can I do in the bedroom?     Make sure that a bedside light is easy to reach.  Do not use oversized bedding that drapes onto the floor.  Have a firm chair that has side arms to use for getting dressed.  What can I do in the kitchen?     Clean up any spills right away.  If you need to reach for something above you, use a sturdy step stool that has a grab bar.  Keep electrical cables out of the way.  Do not use floor polish or wax that makes floors slippery. If you must use wax, make sure that it is non-skid floor wax.  What can I do in the stairways?     Do not leave any items on the stairs.  Make sure that you have a light switch at the top of the stairs and the bottom of the stairs. Have them installed if you do not have them.  Make sure that there are handrails on both sides of the stairs. Fix handrails that are broken or loose. Make sure that handrails are as long as the stairways.  Install non-slip stair treads on all stairs in your home.  Avoid having throw rugs at the top or bottom of stairways, or secure the rugs with carpet tape to prevent them from moving.  Choose a carpet design that does not hide the edge of steps on the stairway.  Check any carpeting to make sure that it is firmly attached to the stairs. Fix any carpet that is loose or worn.  What can I do on the outside of my home?     Use bright outdoor lighting.  Regularly repair the edges of walkways and driveways and fix any cracks.  Remove high doorway thresholds.  Trim any shrubbery on the main path into your home.  Regularly check that handrails are securely fastened and in good repair. Both sides of any steps should have handrails.  Install guardrails along the edges of any raised decks or porches.  Clear walkways of debris and clutter, including tools and rocks.  Have leaves, snow, and ice cleared regularly.  Use sand or salt on walkways during winter months.  In the garage, clean up any spills right away, including grease or oil spills.  What other actions can I take?     Wear closed-toe shoes that fit well and support your feet. Wear shoes that have rubber soles or low heels.  Use mobility aids as needed, such as canes, walkers, scooters, and crutches.  Review your medicines with your health care provider. Some medicines can cause dizziness or changes in blood pressure, which increase your risk of falling.  Talk with your health care provider about other ways that you can decrease your risk of falls. This may include working with a physical therapist or  to improve your strength, balance, and endurance.    Where to find more information  Centers for Disease Control and Prevention, ADARSH: https://www.cdc.gov  National [place] [employer] A[place]: https://ae2sekc.kayley.nih.gov  Contact a health care provider if:  You are afraid of falling at home.  You feel weak, drowsy, or dizzy at home.  You fall at home.  Summary  There are many simple things that you can do to make your home safe and to help prevent falls.  Ways to make your home safe include removing tripping hazards and installing grab bars in the bathroom.  Ask for help when making these changes in your home.  This information is not intended to replace advice given to you by your health care provider. Make sure you discuss any questions you have with your health care provider.

## 2021-12-12 NOTE — ED ADULT NURSE NOTE - NSIMPLEMENTINTERV_GEN_ALL_ED
Implemented All Universal Safety Interventions:  [place] to call system. Call bell, personal items and telephone within reach. Instruct patient to call for assistance. Room bathroom lighting operational. Non-slip footwear when patient is off stretcher. Physically safe environment: no spills, clutter or unnecessary equipment. Stretcher in lowest position, wheels locked, appropriate side rails in place.
97

## 2021-12-12 NOTE — ED PROVIDER NOTE - PATIENT PORTAL LINK FT
You can access the FollowMyHealth Patient Portal offered by [facility] by registering at the following website: [email or web address]. By joining [facility]’s FollowMyHealth portal, you will also be able to view your health information using other applications (apps) compatible with our system.

## 2021-12-12 NOTE — ED ADULT TRIAGE NOTE - CHIEF COMPLAINT QUOTE
Patient BIBA from [facility] [employer], she fell when the  hit the breaks landing on her back. pt denies loc or head injury, denies blood thinners. pt complains of back pain.

## 2021-12-12 NOTE — ED PROVIDER NOTE - ATTENDING CONTRIBUTION TO CARE
61yoF with h/o HTN, thyr ca in remission, on no antiplt/coag agents, presents with fall. States was on bus and  stopped suddenly and she fell onto her back onto the steps at the back of the bus with her head hitting the ground. Reports pain to her L posterior ribs, worse with mvmt and deep breaths. Also some bruising to L thigh and R distal thigh with no pain to area. Ambulatory then and now. Denies CP, SOB, abd pain, vomiting, dark urine, weakness or numbness of extrems, neck pain, and all other symptoms. On exam, afebrile, hemodynamically stable, saturating well, NAD, well appearing, sitting comfortably in bed, no WOB, speaking full sentences, head NCAT, no CTL spine TTP/stepoff/deformity, EOMI, anicteric, MMM, no JVD, RRR, nml S1/S2, no m/r/g, lungs CTAB, no w/r/r, tenderness to L posterior ribs with no ecchymosis, abd soft, NT, ND, nml BS, no rebound or guarding, AAO, CN's 3-12 intact, CHASE spontaneously, no leg cyanosis or edema, no extrem TTP/stepoff/deformity, skin warm, well perfused, no rashes or hives. Full chest expansion with no splinting or WOB, no desats. No abdominal pain or lower/flank back pain and low suspicion for retroperitoneal bleed. No signs of cord compression or elevated ICP and imaging normal. Patient is well appearing, NAD, afebrile, hemodynamically stable. Any available tests and studies were discussed with patient. Discharged with instructions in further symptomatic care, return precautions, and need for PMD f/u.

## 2021-12-12 NOTE — ED PROVIDER NOTE - PHYSICAL EXAMINATION
Gen: NAD, AOx3  Head: NCAT  HEENT: PERRL, oral mucosa moist, normal conjunctiva, oropharynx clear without exudate or erythema  Lung: CTAB, no respiratory distress, no wheezing, rales, rhonchi  CV: normal s1/s2, rrr, Normal perfusion, pulses 2+ throughout  Abd: soft, NTND, no CVA tenderness  Genitourinary: no pelvic tenderness  MSK: No edema, no visible deformities, full range of motion in all 4 extremities, TTP L lower rib   Neuro: CN II-XII grossly intact, No focal neurologic deficits, no nystagmus/pronator drift, coordination/sensation intact, strength 5/5 BUE/BLE, steady gait   Skin: No rash   Psych: normal affect Gen: NAD, AOx3  Head: NCAT  HEENT: PERRL, oral mucosa moist, normal conjunctiva, oropharynx clear without exudate or erythema  Lung: CTAB, no respiratory distress, no wheezing, rales, rhonchi  CV: normal s1/s2, rrr, Normal perfusion, pulses 2+ throughout  Abd: soft, NTND, no CVA tenderness  Genitourinary: no pelvic tenderness  MSK: No edema, no visible deformities, full range of motion in all 4 extremities, no spinal tenderness, TTP L lower rib   Neuro: CN II-XII grossly intact, No focal neurologic deficits, no nystagmus/pronator drift, coordination/sensation intact, strength 5/5 BUE/BLE, steady gait   Skin: No rash   Psych: normal affect

## 2021-12-12 NOTE — ED PROVIDER NOTE - NS ED ROS FT
Constitutional: (-) fever  Eyes/ENT: (-) visual changes   Cardiovascular: (-) chest pain, (-) syncope  Respiratory: (-) cough, (-) shortness of breath  Gastrointestinal: (-) vomiting, (-) diarrhea  Genitourinary: (-) dysuria, (-) hesitancy, (-) frequency   Musculoskeletal: (-) neck pain, (-) back pain, (-) joint pain, L rib pain  Integumentary: (-) rash, (-) edema  Neurological: (-) headache, (-) altered mental status  Allergic/Immunologic: (-) pruritus

## 2021-12-16 NOTE — PHYSICAL EXAM
[FreeTextEntry1] : CERVICAL\par GEN: AAOx3. NAD.\par INSP: Spine alignment. (+) Anterocollis\par GAIT: (-) Antalgic. Normal reciprocating heel to toe\par SENS: Grossly intact to LT BLE.\par REFLEXES: Symmetric biceps, triceps, brachioradialis, pronator teres. Foreman's (-) R (-) L.\par TONE: Normal. No clonus.\par SPECIAL: Spurling (-) R (-) L.   Axial Compression (-).\par PALP: Midline/Spinous processes (-).   Paraspinals (+) R  (+) L.   \par            Trap (+) R (+) L.     Lev Scap (-) R (-) L.    Rhomboid (-) R (-) L.\par \par CS ROM:                                                              \par Flex               Full.    Axial Sx (-).      UE Sx (-) L (-) R\par Ext                 Full.    Axial Sx (-).      UE Sx (-) L (-) R\par Lat Flex         Full.    Axial Sx (-).      UE Sx (-) L (-) R\par Rotation         Full.    Axial Sx (-).      UE Sx (-) L (-) R\par Oblique Ext    Full.    Axial Sx (-).      UE Sx (-) L (-) R\par \par  \par SHOULDER ROM:   RIGHT           LEFT\par Flex                         Full. (-)          Full. (-)\par ABd                         Full. (-)          Full. (-)\par IR                             Full. (-)          Full. (-)\par ER                            Full. (-)          Full. (-)\par \par STRENGTH:    RIGHT      LEFT\par SH ABd            5/5           5/5\par Elb Flex            5/5 5/5\par Elb Ext              5/5 5/5\par Wrist Flex         5/5 5/5\par Wrist Ext           5/5 5/5\par                    5/5           5/5

## 2021-12-16 NOTE — HISTORY OF PRESENT ILLNESS
[FreeTextEntry1] : Referring Physician: Dr. Calderon Nguyen MD\par \par Ms. MAMI MORRISON is a very pleasant 61 year female who comes in for evaluation of Neck pain  that has been ongoing for several years after undergoing C5/6 ACDF in 2006 and C6/7 ACDF in 2012. The patient sees a Pain Management physician for ANNABEL/MBB/RFA all of which provide some relief, she has also done PT which helps temporarily. The pain is focal to the Neck non-radiating constant and described as aching/sharp with headaches. The patient also describes tightness/spasm of the muscles in her neck and a sensation of pulling forward of her neck. The pain is rated as 6/10 and ranges from 6-10/10. The patient's symptoms are aggravated by any movement and alleviated by PT and injections. The patient is Retired/no longer working. The patient denies any night pain, numbness/tingling, weakness, or bowel/bladder dysfunction. The patient has no other complaints at this time.\par

## 2021-12-16 NOTE — DATA REVIEWED
[Plain X-Rays] : plain X-Rays [FreeTextEntry1] : XR CSPINE 05/2021: Anterior cervical discectomy and fusion hardware at C5-6 and C6-7. \par \par MRI CSPINE 5/2021: ACDF C5/C6 and C6/C7. C4/5 disc osteophyte with central canal and right foraminal stenosis; C7/T1 anterolisthesis, disc osteophyte, left foraminal stenosis.\par

## 2021-12-16 NOTE — ASSESSMENT
[FreeTextEntry1] : Impression:\par 1. Cervical Dystonia/Anterocollis\par \par Plan: The history, physical examination, and imaging were reviewed. The imaging results and diagnosis were discussed with the patient along with treatment options including physical therapy, oral medication, interventional spine procedures, and surgery; as well as alternative therapeutics such as acupuncture and massage. We also discussed the importance of activity modification, ergonomics, and posture in the long term management of the condition.  At this time the patient is interested in interventional options for symptom reduction. We will plan for Intramuscular BOTOX injections to the Splenius Capitis/Semispinalis Capitis, Trapezius and SCM. We discussed all the risks, benefits, alternative treatments, and prognosis. The patient expressed understanding and would like to move forward. We will schedule for the injection as well as follow up post-procedure. The patient expressed verbal understanding and is in agreement with the plan of care. All of the patient's questions and concerns were addressed during today's visit.\par \par

## 2021-12-27 LAB
ACANTHOCYTES BLD QL SMEAR: NORMAL
ACANTHOCYTES BLD QL SMEAR: NORMAL
ANISOCYTOSIS BLD QL SMEAR: NORMAL
ANISOCYTOSIS BLD QL SMEAR: NORMAL
AUER BODIES BLD QL SMEAR: NORMAL
AUER BODIES BLD QL SMEAR: NORMAL
BASO STIPL BLD QL SMEAR: NORMAL
BASO STIPL BLD QL SMEAR: NORMAL
BASOPHILS # BLD AUTO: NORMAL
BASOPHILS # BLD: NORMAL
BASOPHILS # BLD: NORMAL
BASOPHILS NFR BLD AUTO: NORMAL
BASOPHILS NFR BLD: NORMAL
BLASTS NFR BLD: NORMAL
BLASTS NFR BLD: NORMAL
BLOOD FILM EXAM (NORTH): NORMAL
BLOOD FILM EXAM (SOUTH): NORMAL
BURR CELLS BLD QL SMEAR: NORMAL
CABOT RINGS BLD QL SMEAR: NORMAL
CABOT RINGS BLD QL SMEAR: NORMAL
DACRYOCYTES BLD QL SMEAR: NORMAL
DACRYOCYTES BLD QL SMEAR: NORMAL
DIFFERENTIAL METHOD BLD: NORMAL
DIFFERENTIAL METHOD BLD: NORMAL
DOHLE BOD BLD QL SMEAR: NORMAL
DOHLE BOD BLD QL SMEAR: NORMAL
EOSINOPHIL # BLD AUTO: NORMAL
EOSINOPHIL # BLD: NORMAL
EOSINOPHIL # BLD: NORMAL
EOSINOPHIL NFR BLD AUTO: NORMAL
EOSINOPHIL NFR BLD: NORMAL
ERYTHROCYTE [DISTWIDTH] IN BLOOD BY AUTOMATED COUNT: NORMAL
ERYTHROCYTE [DISTWIDTH] IN BLOOD BY AUTOMATED COUNT: NORMAL
GIANT PLATELETS BLD QL SMEAR: NORMAL
GIANT PLATELETS BLD QL SMEAR: NORMAL
GRANULOCYTES # BLD: NORMAL
GRANULOCYTES # BLD: NORMAL
GRANULOCYTES NFR BLD: NORMAL
GRANULOCYTES NFR BLD: NORMAL
HCT VFR BLD AUTO: NORMAL
HCT VFR BLD AUTO: NORMAL
HCT VFR BLD CALC: NORMAL
HELMET CELLS BLD QL SMEAR: NORMAL
HELMET CELLS BLD QL SMEAR: NORMAL
HGB BLD-MCNC: NORMAL
HOWELL-JOLLY BOD BLD QL SMEAR: NORMAL
HOWELL-JOLLY BOD BLD QL SMEAR: NORMAL
HYPOCHROMIA BLD QL SMEAR: NORMAL
HYPOCHROMIA BLD QL SMEAR: NORMAL
IMM GRANULOCYTES # BLD: NORMAL
IMM GRANULOCYTES # BLD: NORMAL
IMM GRANULOCYTES NFR BLD AUTO: NORMAL
IMM GRANULOCYTES NFR BLD: NORMAL
IMM GRANULOCYTES NFR BLD: NORMAL
LYMPHOCYTES # BLD AUTO: NORMAL
LYMPHOCYTES # BLD: NORMAL
LYMPHOCYTES # BLD: NORMAL
LYMPHOCYTES NFR BLD AUTO: NORMAL
LYMPHOCYTES NFR BLD: NORMAL
Lab: NORMAL
MACROCYTES BLD QL SMEAR: NORMAL
MACROCYTES BLD QL SMEAR: NORMAL
MAN DIFF?: NORMAL
MANUAL DIFFERENTIAL REFLEX (SOUTH): NORMAL
MCH RBC QN AUTO: NORMAL
MCH RBC QN AUTO: NORMAL
MCHC RBC AUTO-ENTMCNC: NORMAL
MCHC RBC AUTO-ENTMCNC: NORMAL
MCHC RBC-ENTMCNC: NORMAL
MCHC RBC-ENTMCNC: NORMAL
MCV RBC AUTO: NORMAL
METAMYELOCYTES NFR BLD: NORMAL
METAMYELOCYTES NFR BLD: NORMAL
MICROCYTES BLD QL SMEAR: NORMAL
MICROCYTES BLD QL SMEAR: NORMAL
MONOCYTES # BLD AUTO: NORMAL
MONOCYTES # BLD: NORMAL
MONOCYTES # BLD: NORMAL
MONOCYTES NFR BLD AUTO: NORMAL
MONOCYTES NFR BLD: NORMAL
MYELOCYTES NFR BLD: NORMAL
MYELOCYTES NFR BLD: NORMAL
NEUTROPHILS # BLD AUTO: NORMAL
NEUTROPHILS NFR BLD AUTO: NORMAL
NEUTS BAND NFR BLD: NORMAL
NEUTS BAND NFR BLD: NORMAL
NEUTS HYPERSEG NFR BLD: NORMAL
NEUTS HYPERSEG NFR BLD: NORMAL
NEUTS SEG NFR BLD: NORMAL
NEUTS SEG NFR BLD: NORMAL
NO PRINT (NORTH): NORMAL
NO PRINT (SOUTH): NORMAL
NRBC # BLD MANUAL: NORMAL
NRBC#: NORMAL
NUCLEATED RBC (NORTH): NORMAL
NUCLEATED RBC (SOUTH): NORMAL
OVALOCYTES BLD QL SMEAR: NORMAL
OVALOCYTES BLD QL SMEAR: NORMAL
PLASMA CELL (NORTH): NORMAL
PLASMA CELL (SOUTH): NORMAL
PLATELET # BLD AUTO: NORMAL
PLATELET # BLD: NORMAL
PLATELET # BLD: NORMAL
PLATELET BLD QL SMEAR: NORMAL
PLATELET BLD QL SMEAR: NORMAL
PLATELET CLUMP BLD QL SMEAR: NORMAL
PLATELET CLUMP BLD QL SMEAR: NORMAL
PMV BLD AUTO: NORMAL
PMV BLD AUTO: NORMAL
PMV BLD: NORMAL
POIKILOCYTOSIS BLD QL SMEAR: NORMAL
POIKILOCYTOSIS BLD QL SMEAR: NORMAL
POLYCHROMASIA BLD QL SMEAR: NORMAL
POLYCHROMASIA BLD QL SMEAR: NORMAL
PROLYMPHOCYTES NFR BLD: NORMAL
PROLYMPHOCYTES NFR BLD: NORMAL
PROMYELOCYTES NFR BLD: NORMAL
PROMYELOCYTES NFR BLD: NORMAL
RBC # BLD AUTO: NORMAL
RBC # BLD AUTO: NORMAL
RBC # BLD: NORMAL
RBC # FLD: NORMAL
RBC MORPH BLD: NORMAL
RBC MORPH BLD: NORMAL
ROULEAUX BLD QL SMEAR: NORMAL
ROULEAUX BLD QL SMEAR: NORMAL
SCHISTOCYTES BLD QL SMEAR: NORMAL
SICKLE CELLS BLD QL SMEAR: NORMAL
SICKLE CELLS BLD QL SMEAR: NORMAL
SMEAR (NORTH): NORMAL
SMEAR (SOUTH): NORMAL
SPHEROCYTES BLD QL SMEAR: NORMAL
SPHEROCYTES BLD QL SMEAR: NORMAL
SUSPECT FLAGS (NORTH): NORMAL
SUSPECT FLAGS (SOUTH): NORMAL
TARGETS BLD QL SMEAR: NORMAL
TARGETS BLD QL SMEAR: NORMAL
TOTAL CELLS COUNTED BLD: NORMAL
TOTAL CELLS COUNTED BLD: NORMAL
TOXIC GRANULES BLD QL SMEAR: NORMAL
TOXIC GRANULES BLD QL SMEAR: NORMAL
VARIANT LYMPHS NFR BLD: NORMAL
VARIANT LYMPHS NFR BLD: NORMAL
WBC # BLD: NORMAL
WBC # BLD: NORMAL
WBC # FLD AUTO: NORMAL

## 2022-01-08 ENCOUNTER — APPOINTMENT (OUTPATIENT)
Dept: ENDOCRINOLOGY | Facility: CLINIC | Age: 62
End: 2022-01-08

## 2022-02-11 NOTE — ED PROVIDER NOTE - IV ALTEPLASE EXCL ABS HIDDEN
[facility]  [id number]  [address] [address]  Phone: [phone]  Fax: [phone]    February 11, 2022       Patient: Amena Hernandez   MR Number: [id number]   YOB: 1977   Date of Visit: 2/11/2022       Brielle Lauren was seen for a follow up visit today. Here is my assessment and plan as well as an attached copy of her visit today:    Obstructive sleep apnea (adult) (pediatric)  Chronic-Stable: Reviewed and analyzed results of physiologic download from patient's machine and reviewed with patient. Supplies and parts as needed for her machine. These are medically necessary. Limit caffeine use after 3pm. Based on the analyzed data will continue with current settings. Stable on her machine at current settings, getting benefit from the use, and having minimal side effects. Encouraged her to get more sleep and machine use during the night to help with Excessive daytime sleepiness. Discussed compliance with machine and stimulant medication. Will see back in 6 months or sooner if issues arise. Discussed the recall of [employer] devices with patient and the severity of her sleep apnea. Discussed the risks of untreated sleep apnea including but not limited to car accidents, heart attacks, strokes, and death. Alternative therapy may not exist or may be severely limited. Felt the benefits of continued usage of these devices may outweigh the risks identified in the recall notification. Advised to avoid use of unproven cleaning methods, such as ozone. Due to the unknown variables each patient will have to make a determination in his/her own. Please contact your equipment company for further instruction until an alternative is found. Encouraged patient to register her machine for the recall and provided phone number. Asthma  Chronic- Stable. Discussed the importance of treating obstructive sleep apnea as part of the management of this disorder.   Cont any meds per PCP and other physicians. Essential hypertension  Chronic- Stable. Discussed the importance of treating obstructive sleep apnea as part of the management of this disorder. Cont any meds per PCP and other physicians. Chronic post-traumatic stress disorder (PTSD) with anxiety  Chronic- Stable. Discussed the importance of treating obstructive sleep apnea as part of the management of this disorder. Cont any meds per PCP and other physicians. Non morbid obesity, unspecified obesity type  Chronic-not stable:  Discussed importance of treating obstructive sleep apnea and getting sufficient sleep to assist with weight control. Encouraged her to work on weight loss through diet and exercise. Recommended DASH or Mediterranean diets. Chronic GERD  Chronic- Stable. Discussed the importance of treating obstructive sleep apnea as part of the management of this disorder. Cont any meds per PCP and other physicians. Excessive daytime sleepiness  Chronic- Stable: Discussed the importance of treating obstructive sleep apnea as part of the management of this disorder. PDMP reviewed. Continue taking Provigil 200mg BID. She denies side effects from the medication. Will refill at the current dose. No driving if sleepy. Discussed working on sleep hygiene. Keep a consistent sleep schedule. Get up at the same time every day, even on weekends or during vacations. Set a bedtime that is early enough for you to get at least 7 hours of sleep. Dont go to bed unless you are sleepy. If you dont fall asleep after 20 minutes, get out of bed. Establish a relaxing bedtime routine. Use your bed only for sleep and sex. Make your bedroom quiet and relaxing. Keep the room at a comfortable, cool temperature. Limit exposure to bright light in the evenings. Turn off electronic devices at least 30 minutes before bedtime. Dont eat a large meal before bedtime. If you are hungry at night, eat a light, healthy snack.  Exercise regularly and maintain a healthy diet. Avoid consuming caffeine in the late afternoon or evening. Avoid consuming alcohol before bedtime. Reduce your fluid intake before bedtime. If you have questions or concerns, please do not hesitate to call me. I look forward to following Delores along with you.     Sincerely,    [facility] providers:  Bravo Paulino MD  [address] show

## 2022-06-14 PROBLEM — C73 CANCER OF THYROID: Status: ACTIVE | Noted: 2019-06-19

## 2022-06-14 PROBLEM — E03.9 HYPOTHYROIDISM: Status: ACTIVE | Noted: 2021-10-03

## 2022-06-15 ENCOUNTER — APPOINTMENT (OUTPATIENT)
Dept: ENDOCRINOLOGY | Facility: CLINIC | Age: 62
End: 2022-06-15
Payer: MEDICARE

## 2022-06-15 DIAGNOSIS — E03.9 HYPOTHYROIDISM, UNSPECIFIED: ICD-10-CM

## 2022-06-15 DIAGNOSIS — C73 MALIGNANT NEOPLASM OF THYROID GLAND: ICD-10-CM

## 2022-06-15 PROCEDURE — 99443: CPT | Mod: 95

## 2022-06-15 RX ORDER — LEVOTHYROXINE SODIUM 0.09 MG/1
88 TABLET ORAL
Qty: 90 | Refills: 3 | Status: ACTIVE | COMMUNITY
Start: 1900-01-01 | End: 1900-01-01

## 2022-06-15 RX ORDER — LOSARTAN POTASSIUM 100 MG/1
100 TABLET, FILM COATED ORAL
Refills: 0 | Status: DISCONTINUED | COMMUNITY
End: 2022-06-15

## 2023-03-16 NOTE — ED PROVIDER NOTE - RESPIRATORY NEGATIVE STATEMENT, MLM
no chest pain, no cough, and no shortness of breath. Solaraze Counseling:  I discussed with the patient the risks of Solaraze including but not limited to erythema, scaling, itching, weeping, crusting, and pain.

## 2024-10-02 NOTE — PATIENT PROFILE ADULT - NSPROEXTENSIONSOFSELF_GEN_A_NUR
CERTIFICATE OF WORK       October 2, 2024      Re: Meghan Hansen  [address]  [facility] [id number]    To Whom It Concerns:    This is to certify that Meghan Hansen was seen today for a medical appointment. Due to her symptoms, please excuse Ms. Hansen from work until 10/7/24.     Thank you,        Sasha Mcnulty, [facility]-[address]  [address] [place] [address]  Phone: [phone]  Fax: [phone]   none

## 2025-03-10 NOTE — ED PROVIDER NOTE - EYES NEGATIVE STATEMENT, MLM
Spoke with patient regarding provider recommendations.  Pt verbalized understanding, no further questions or concerns at this time.      no discharge, no irritation, no pain, no redness, and no visual changes.